# Patient Record
Sex: MALE | Race: WHITE
[De-identification: names, ages, dates, MRNs, and addresses within clinical notes are randomized per-mention and may not be internally consistent; named-entity substitution may affect disease eponyms.]

---

## 2019-01-23 ENCOUNTER — HOSPITAL ENCOUNTER (OUTPATIENT)
Dept: HOSPITAL 57 - BURULT | Age: 79
Discharge: HOME | End: 2019-01-23
Payer: MEDICARE

## 2019-01-23 DIAGNOSIS — R10.31: ICD-10-CM

## 2019-01-23 DIAGNOSIS — K59.09: Primary | ICD-10-CM

## 2019-01-23 DIAGNOSIS — Z86.010: ICD-10-CM

## 2019-01-23 DIAGNOSIS — D69.6: ICD-10-CM

## 2019-01-23 LAB
ALBUMIN SERPL BCG-MCNC: 4.1 G/DL (ref 3.4–4.8)
ALP SERPL-CCNC: 75 U/L (ref 40–150)
ALT SERPL W P-5'-P-CCNC: 57 U/L (ref 8–55)
ANION GAP SERPL CALC-SCNC: 15 MMOL/L (ref 10–20)
AST SERPL-CCNC: 45 U/L (ref 5–34)
BILIRUB SERPL-MCNC: 1.2 MG/DL (ref 0.2–1.2)
BUN SERPL-MCNC: 24 MG/DL (ref 8.4–25.7)
CALCIUM SERPL-MCNC: 9.9 MG/DL (ref 7.8–10.44)
CHLORIDE SERPL-SCNC: 106 MMOL/L (ref 98–107)
CO2 SERPL-SCNC: 22 MMOL/L (ref 23–31)
CREAT CL PREDICTED SERPL C-G-VRATE: 0 ML/MIN (ref 70–130)
GLOBULIN SER CALC-MCNC: 2.6 G/DL (ref 2.4–3.5)
GLUCOSE SERPL-MCNC: 135 MG/DL (ref 83–110)
INR PPP: 1.1
POTASSIUM SERPL-SCNC: 3.9 MMOL/L (ref 3.5–5.1)
PROTHROMBIN TIME: 13.8 SEC (ref 12–14.7)
SODIUM SERPL-SCNC: 139 MMOL/L (ref 136–145)

## 2019-01-23 PROCEDURE — 76705 ECHO EXAM OF ABDOMEN: CPT

## 2019-01-23 PROCEDURE — 36415 COLL VENOUS BLD VENIPUNCTURE: CPT

## 2019-01-23 PROCEDURE — 80053 COMPREHEN METABOLIC PANEL: CPT

## 2019-01-23 PROCEDURE — 85610 PROTHROMBIN TIME: CPT

## 2019-01-23 NOTE — ULT
HEPATIC ULTRASOUND:

1/23/19

 

Ultrasonography of the right upper quadrant was performed. The liver is slightly large measuring 16.5
 cm in oblique sagittal length. It is not seen exceptionally well but no masses or dilated ducts were
 noted. It may be slightly echo dense, but this is not certain. The pancreas was largely obscured by 
gas, but the visible areas appeared normal. The aorta was not scanned on this study. The gallbladder 
contained no stones. The wall was not thickened. The common bile duct was borderline in size at 5 to 
6 mm in caliber. The right kidney appeared normal and was 11.3 cm long. 

 

IMPRESSION:  

Borderline hepatic size without focal pathology seen. Borderline sized (5 to 6 mm) common bile duct. 
No specific pathology was imaged.

 

POS: HOME

## 2021-11-30 ENCOUNTER — HOSPITAL ENCOUNTER (OUTPATIENT)
Dept: HOSPITAL 92 - LABBT | Age: 81
Discharge: HOME | End: 2021-11-30
Attending: NEUROLOGICAL SURGERY
Payer: MEDICARE

## 2021-11-30 DIAGNOSIS — Z01.818: Primary | ICD-10-CM

## 2021-11-30 DIAGNOSIS — Z20.822: ICD-10-CM

## 2021-11-30 DIAGNOSIS — M54.12: ICD-10-CM

## 2021-11-30 LAB
ANION GAP SERPL CALC-SCNC: 13 MMOL/L (ref 10–20)
BUN SERPL-MCNC: 20 MG/DL (ref 8.4–25.7)
CALCIUM SERPL-MCNC: 9.1 MG/DL (ref 7.8–10.44)
CHLORIDE SERPL-SCNC: 106 MMOL/L (ref 98–107)
CO2 SERPL-SCNC: 22 MMOL/L (ref 23–31)
CREAT CL PREDICTED SERPL C-G-VRATE: 0 ML/MIN (ref 70–130)
GLUCOSE SERPL-MCNC: 176 MG/DL (ref 83–110)
HGB BLD-MCNC: 15.2 G/DL (ref 13.5–17.5)
MCH RBC QN AUTO: 27.8 PG (ref 27–33)
MCV RBC AUTO: 85.4 FL (ref 81.2–95.1)
PLATELET # BLD AUTO: 106 10X3/UL (ref 150–450)
POTASSIUM SERPL-SCNC: 3.9 MMOL/L (ref 3.5–5.1)
RBC # BLD AUTO: 5.47 10X6/UL (ref 4.32–5.72)
SODIUM SERPL-SCNC: 137 MMOL/L (ref 136–145)
WBC # BLD AUTO: 4 10X3/UL (ref 3.5–10.5)

## 2021-11-30 PROCEDURE — 93005 ELECTROCARDIOGRAM TRACING: CPT

## 2021-11-30 PROCEDURE — 85027 COMPLETE CBC AUTOMATED: CPT

## 2021-11-30 PROCEDURE — 80048 BASIC METABOLIC PNL TOTAL CA: CPT

## 2021-11-30 PROCEDURE — 93010 ELECTROCARDIOGRAM REPORT: CPT

## 2021-11-30 PROCEDURE — U0003 INFECTIOUS AGENT DETECTION BY NUCLEIC ACID (DNA OR RNA); SEVERE ACUTE RESPIRATORY SYNDROME CORONAVIRUS 2 (SARS-COV-2) (CORONAVIRUS DISEASE [COVID-19]), AMPLIFIED PROBE TECHNIQUE, MAKING USE OF HIGH THROUGHPUT TECHNOLOGIES AS DESCRIBED BY CMS-2020-01-R: HCPCS

## 2021-11-30 PROCEDURE — U0005 INFEC AGEN DETEC AMPLI PROBE: HCPCS

## 2021-12-03 ENCOUNTER — HOSPITAL ENCOUNTER (OUTPATIENT)
Dept: HOSPITAL 92 - SDC | Age: 81
Discharge: HOME | End: 2021-12-03
Attending: NEUROLOGICAL SURGERY
Payer: MEDICARE

## 2021-12-03 VITALS — BODY MASS INDEX: 33.9 KG/M2

## 2021-12-03 DIAGNOSIS — Z79.02: ICD-10-CM

## 2021-12-03 DIAGNOSIS — E11.9: ICD-10-CM

## 2021-12-03 DIAGNOSIS — Z79.899: ICD-10-CM

## 2021-12-03 DIAGNOSIS — E78.5: ICD-10-CM

## 2021-12-03 DIAGNOSIS — Z79.84: ICD-10-CM

## 2021-12-03 DIAGNOSIS — F17.210: ICD-10-CM

## 2021-12-03 DIAGNOSIS — G47.33: ICD-10-CM

## 2021-12-03 DIAGNOSIS — I10: ICD-10-CM

## 2021-12-03 DIAGNOSIS — I11.9: ICD-10-CM

## 2021-12-03 DIAGNOSIS — Z79.82: ICD-10-CM

## 2021-12-03 DIAGNOSIS — M54.12: Primary | ICD-10-CM

## 2021-12-03 PROCEDURE — C1713 ANCHOR/SCREW BN/BN,TIS/BN: HCPCS

## 2021-12-03 PROCEDURE — 22551 ARTHRD ANT NTRBDY CERVICAL: CPT

## 2021-12-03 PROCEDURE — 22552 ARTHRD ANT NTRBD CERVICAL EA: CPT

## 2021-12-03 PROCEDURE — 0RG20A0 FUSION OF 2 OR MORE CERVICAL VERTEBRAL JOINTS WITH INTERBODY FUSION DEVICE, ANTERIOR APPROACH, ANTERIOR COLUMN, OPEN APPROACH: ICD-10-PCS | Performed by: NEUROLOGICAL SURGERY

## 2021-12-03 PROCEDURE — 76000 FLUOROSCOPY <1 HR PHYS/QHP: CPT

## 2021-12-03 PROCEDURE — 0RT30ZZ RESECTION OF CERVICAL VERTEBRAL DISC, OPEN APPROACH: ICD-10-PCS | Performed by: NEUROLOGICAL SURGERY

## 2021-12-03 PROCEDURE — 20936 SP BONE AGRFT LOCAL ADD-ON: CPT

## 2021-12-03 PROCEDURE — C1776 JOINT DEVICE (IMPLANTABLE): HCPCS

## 2021-12-03 PROCEDURE — 0RG2070 FUSION OF 2 OR MORE CERVICAL VERTEBRAL JOINTS WITH AUTOLOGOUS TISSUE SUBSTITUTE, ANTERIOR APPROACH, ANTERIOR COLUMN, OPEN APPROACH: ICD-10-PCS | Performed by: NEUROLOGICAL SURGERY

## 2021-12-03 PROCEDURE — 20930 SP BONE ALGRFT MORSEL ADD-ON: CPT

## 2021-12-03 PROCEDURE — 22853 INSJ BIOMECHANICAL DEVICE: CPT

## 2022-01-11 ENCOUNTER — HOSPITAL ENCOUNTER (OUTPATIENT)
Dept: HOSPITAL 92 - TBSIIMAG | Age: 82
Discharge: HOME | End: 2022-01-11
Attending: NEUROLOGICAL SURGERY
Payer: MEDICARE

## 2022-01-11 DIAGNOSIS — M54.12: Primary | ICD-10-CM

## 2022-01-11 DIAGNOSIS — M47.816: ICD-10-CM

## 2022-01-11 DIAGNOSIS — Z98.890: ICD-10-CM

## 2022-01-11 PROCEDURE — 72040 X-RAY EXAM NECK SPINE 2-3 VW: CPT

## 2022-01-11 PROCEDURE — 72100 X-RAY EXAM L-S SPINE 2/3 VWS: CPT

## 2022-06-30 ENCOUNTER — HOSPITAL ENCOUNTER (EMERGENCY)
Dept: HOSPITAL 92 - CSHERS | Age: 82
Discharge: HOME | End: 2022-06-30
Payer: MEDICARE

## 2022-06-30 DIAGNOSIS — I25.10: ICD-10-CM

## 2022-06-30 DIAGNOSIS — E78.5: ICD-10-CM

## 2022-06-30 DIAGNOSIS — R25.1: ICD-10-CM

## 2022-06-30 DIAGNOSIS — R68.89: Primary | ICD-10-CM

## 2022-06-30 DIAGNOSIS — I10: ICD-10-CM

## 2022-06-30 LAB
ALBUMIN SERPL BCG-MCNC: 3.9 G/DL (ref 3.4–4.8)
ALP SERPL-CCNC: 83 U/L (ref 40–110)
ALT SERPL W P-5'-P-CCNC: 37 U/L (ref 8–55)
ANION GAP SERPL CALC-SCNC: 14 MMOL/L (ref 10–20)
AST SERPL-CCNC: 28 U/L (ref 5–34)
BILIRUB SERPL-MCNC: 1.1 MG/DL (ref 0.2–1.2)
BUN SERPL-MCNC: 24 MG/DL (ref 8.4–25.7)
CALCIUM SERPL-MCNC: 8.7 MG/DL (ref 7.8–10.44)
CHLORIDE SERPL-SCNC: 103 MMOL/L (ref 98–107)
CO2 SERPL-SCNC: 24 MMOL/L (ref 23–31)
CREAT CL PREDICTED SERPL C-G-VRATE: 0 ML/MIN (ref 70–130)
GLOBULIN SER CALC-MCNC: 2.5 G/DL (ref 2.4–3.5)
GLUCOSE SERPL-MCNC: 100 MG/DL (ref 83–110)
HGB BLD-MCNC: 15 G/DL (ref 13.5–17.5)
MCH RBC QN AUTO: 27.2 PG (ref 27–33)
MCV RBC AUTO: 82.2 FL (ref 81.2–95.1)
MDIFF COMPLETE?: YES
PLATELET # BLD AUTO: 114 10X3/UL (ref 150–450)
POTASSIUM SERPL-SCNC: 3.8 MMOL/L (ref 3.5–5.1)
RBC # BLD AUTO: 5.51 10X6/UL (ref 4.32–5.72)
SODIUM SERPL-SCNC: 137 MMOL/L (ref 136–145)
SP GR UR STRIP: 1.01 (ref 1–1.04)
WBC # BLD AUTO: 5.4 10X3/UL (ref 3.5–10.5)

## 2022-06-30 PROCEDURE — 80053 COMPREHEN METABOLIC PANEL: CPT

## 2022-06-30 PROCEDURE — 84484 ASSAY OF TROPONIN QUANT: CPT

## 2022-06-30 PROCEDURE — 85025 COMPLETE CBC W/AUTO DIFF WBC: CPT

## 2022-06-30 PROCEDURE — 70450 CT HEAD/BRAIN W/O DYE: CPT

## 2022-06-30 PROCEDURE — 71045 X-RAY EXAM CHEST 1 VIEW: CPT

## 2022-06-30 PROCEDURE — 93005 ELECTROCARDIOGRAM TRACING: CPT

## 2022-06-30 PROCEDURE — 81003 URINALYSIS AUTO W/O SCOPE: CPT

## 2023-07-12 ENCOUNTER — HOSPITAL ENCOUNTER (OUTPATIENT)
Dept: HOSPITAL 92 - BICULT | Age: 83
Discharge: HOME | End: 2023-07-12
Attending: PHYSICIAN ASSISTANT
Payer: MEDICARE

## 2023-07-12 DIAGNOSIS — R16.1: ICD-10-CM

## 2023-07-12 DIAGNOSIS — R90.82: ICD-10-CM

## 2023-07-12 DIAGNOSIS — R10.33: Primary | ICD-10-CM

## 2023-07-12 DIAGNOSIS — R53.83: ICD-10-CM

## 2023-07-12 DIAGNOSIS — K74.60: ICD-10-CM

## 2023-07-12 PROCEDURE — 76705 ECHO EXAM OF ABDOMEN: CPT
